# Patient Record
Sex: MALE | Race: WHITE
[De-identification: names, ages, dates, MRNs, and addresses within clinical notes are randomized per-mention and may not be internally consistent; named-entity substitution may affect disease eponyms.]

---

## 2021-01-04 ENCOUNTER — HOSPITAL ENCOUNTER (INPATIENT)
Dept: HOSPITAL 26 - MED | Age: 71
LOS: 7 days | Discharge: NURSING, REHAB OR LONG-TERM CARE | DRG: 177 | End: 2021-01-11
Attending: STUDENT IN AN ORGANIZED HEALTH CARE EDUCATION/TRAINING PROGRAM | Admitting: STUDENT IN AN ORGANIZED HEALTH CARE EDUCATION/TRAINING PROGRAM
Payer: COMMERCIAL

## 2021-01-04 VITALS — SYSTOLIC BLOOD PRESSURE: 111 MMHG | DIASTOLIC BLOOD PRESSURE: 93 MMHG

## 2021-01-04 VITALS — WEIGHT: 120 LBS | BODY MASS INDEX: 16.25 KG/M2 | HEIGHT: 72 IN

## 2021-01-04 DIAGNOSIS — I10: ICD-10-CM

## 2021-01-04 DIAGNOSIS — I69.30: ICD-10-CM

## 2021-01-04 DIAGNOSIS — E43: ICD-10-CM

## 2021-01-04 DIAGNOSIS — I25.10: ICD-10-CM

## 2021-01-04 DIAGNOSIS — E78.5: ICD-10-CM

## 2021-01-04 DIAGNOSIS — K59.00: ICD-10-CM

## 2021-01-04 DIAGNOSIS — M81.0: ICD-10-CM

## 2021-01-04 DIAGNOSIS — E87.8: ICD-10-CM

## 2021-01-04 DIAGNOSIS — K21.9: ICD-10-CM

## 2021-01-04 DIAGNOSIS — U07.1: Primary | ICD-10-CM

## 2021-01-04 DIAGNOSIS — G93.41: ICD-10-CM

## 2021-01-04 DIAGNOSIS — Z85.46: ICD-10-CM

## 2021-01-04 DIAGNOSIS — E87.3: ICD-10-CM

## 2021-01-04 DIAGNOSIS — E86.0: ICD-10-CM

## 2021-01-04 DIAGNOSIS — E11.9: ICD-10-CM

## 2021-01-04 DIAGNOSIS — E87.0: ICD-10-CM

## 2021-01-04 DIAGNOSIS — F32.9: ICD-10-CM

## 2021-01-04 PROCEDURE — U0003 INFECTIOUS AGENT DETECTION BY NUCLEIC ACID (DNA OR RNA); SEVERE ACUTE RESPIRATORY SYNDROME CORONAVIRUS 2 (SARS-COV-2) (CORONAVIRUS DISEASE [COVID-19]), AMPLIFIED PROBE TECHNIQUE, MAKING USE OF HIGH THROUGHPUT TECHNOLOGIES AS DESCRIBED BY CMS-2020-01-R: HCPCS

## 2021-01-04 PROCEDURE — C1758 CATHETER, URETERAL: HCPCS

## 2021-01-05 LAB
ALBUMIN FLD-MCNC: 2.5 G/DL (ref 3.4–5)
ALBUMIN FLD-MCNC: 2.8 G/DL (ref 3.4–5)
AMYLASE SERPL-CCNC: 37 U/L (ref 25–115)
ANION GAP SERPL CALCULATED.3IONS-SCNC: 11.5 MMOL/L (ref 8–16)
ANION GAP SERPL CALCULATED.3IONS-SCNC: 13.2 MMOL/L (ref 8–16)
APPEARANCE UR: CLEAR
AST SERPL-CCNC: 24 U/L (ref 15–37)
AST SERPL-CCNC: 27 U/L (ref 15–37)
BASOPHILS # BLD AUTO: 0 K/UL (ref 0–0.22)
BASOPHILS # BLD AUTO: 0 K/UL (ref 0–0.22)
BASOPHILS NFR BLD AUTO: 0.1 % (ref 0–2)
BASOPHILS NFR BLD AUTO: 0.2 % (ref 0–2)
BILIRUB SERPL-MCNC: 0.3 MG/DL (ref 0–1)
BILIRUB SERPL-MCNC: 0.3 MG/DL (ref 0–1)
BILIRUB UR QL STRIP: NEGATIVE
BUN SERPL-MCNC: 27 MG/DL (ref 7–18)
BUN SERPL-MCNC: 27 MG/DL (ref 7–18)
CHLORIDE SERPL-SCNC: 114 MMOL/L (ref 98–107)
CHLORIDE SERPL-SCNC: 114 MMOL/L (ref 98–107)
CHOLEST/HDLC SERPL: 3.2 {RATIO} (ref 1–4.5)
CO2 SERPL-SCNC: 28 MMOL/L (ref 21–32)
CO2 SERPL-SCNC: 31.2 MMOL/L (ref 21–32)
COLOR UR: YELLOW
CREAT SERPL-MCNC: 0.5 MG/DL (ref 0.6–1.3)
CREAT SERPL-MCNC: 0.7 MG/DL (ref 0.6–1.3)
EOSINOPHIL # BLD AUTO: 0 K/UL (ref 0–0.4)
EOSINOPHIL # BLD AUTO: 0 K/UL (ref 0–0.4)
EOSINOPHIL NFR BLD AUTO: 0.1 % (ref 0–4)
EOSINOPHIL NFR BLD AUTO: 0.1 % (ref 0–4)
ERYTHROCYTE [DISTWIDTH] IN BLOOD BY AUTOMATED COUNT: 15.3 % (ref 11.6–13.7)
ERYTHROCYTE [DISTWIDTH] IN BLOOD BY AUTOMATED COUNT: 15.4 % (ref 11.6–13.7)
GFR SERPL CREATININE-BSD FRML MDRD: (no result) ML/MIN (ref 90–?)
GFR SERPL CREATININE-BSD FRML MDRD: (no result) ML/MIN (ref 90–?)
GLUCOSE SERPL-MCNC: 117 MG/DL (ref 74–106)
GLUCOSE SERPL-MCNC: 122 MG/DL (ref 74–106)
GLUCOSE UR STRIP-MCNC: NEGATIVE MG/DL
HCT VFR BLD AUTO: 35.4 % (ref 36–52)
HCT VFR BLD AUTO: 40.6 % (ref 36–52)
HDLC SERPL-MCNC: 38 MG/DL (ref 40–60)
HGB BLD-MCNC: 11.5 G/DL (ref 12–18)
HGB BLD-MCNC: 13.2 G/DL (ref 12–18)
HGB UR QL STRIP: NEGATIVE
LDLC SERPL CALC-MCNC: 51 MG/DL (ref 60–100)
LEUKOCYTE ESTERASE UR QL STRIP: NEGATIVE
LIPASE SERPL-CCNC: 86 U/L (ref 73–393)
LYMPHOCYTES # BLD AUTO: 0.6 K/UL (ref 2–11.5)
LYMPHOCYTES # BLD AUTO: 0.8 K/UL (ref 2–11.5)
LYMPHOCYTES NFR BLD AUTO: 17.6 % (ref 20.5–51.1)
LYMPHOCYTES NFR BLD AUTO: 17.8 % (ref 20.5–51.1)
MAGNESIUM SERPL-MCNC: 1.9 MG/DL (ref 1.8–2.4)
MCH RBC QN AUTO: 29 PG (ref 27–31)
MCH RBC QN AUTO: 30 PG (ref 27–31)
MCHC RBC AUTO-ENTMCNC: 33 G/DL (ref 33–37)
MCHC RBC AUTO-ENTMCNC: 33 G/DL (ref 33–37)
MCV RBC AUTO: 90.3 FL (ref 80–94)
MCV RBC AUTO: 90.4 FL (ref 80–94)
MONOCYTES # BLD AUTO: 0.2 K/UL (ref 0.8–1)
MONOCYTES # BLD AUTO: 0.3 K/UL (ref 0.8–1)
MONOCYTES NFR BLD AUTO: 6.3 % (ref 1.7–9.3)
MONOCYTES NFR BLD AUTO: 6.5 % (ref 1.7–9.3)
NEUTROPHILS # BLD AUTO: 2.6 K/UL (ref 1.8–7.7)
NEUTROPHILS # BLD AUTO: 3.3 K/UL (ref 1.8–7.7)
NEUTROPHILS NFR BLD AUTO: 75.6 % (ref 42.2–75.2)
NEUTROPHILS NFR BLD AUTO: 75.7 % (ref 42.2–75.2)
NITRITE UR QL STRIP: NEGATIVE
PH UR STRIP: 6 [PH] (ref 5–9)
PHOSPHATE SERPL-MCNC: 3.2 MG/DL (ref 2.5–4.9)
PLATELET # BLD AUTO: 135 K/UL (ref 140–450)
PLATELET # BLD AUTO: 146 K/UL (ref 140–450)
POTASSIUM SERPL-SCNC: 3.2 MMOL/L (ref 3.5–5.1)
POTASSIUM SERPL-SCNC: 3.7 MMOL/L (ref 3.5–5.1)
PROTHROMBIN TIME: 10.8 SECS (ref 10.8–13.4)
RBC # BLD AUTO: 3.92 MIL/UL (ref 4.2–6.1)
RBC # BLD AUTO: 4.49 MIL/UL (ref 4.2–6.1)
SODIUM SERPL-SCNC: 152 MMOL/L (ref 136–145)
SODIUM SERPL-SCNC: 153 MMOL/L (ref 136–145)
TRIGL SERPL-MCNC: 165 MG/DL (ref 30–150)
TSH SERPL DL<=0.05 MIU/L-ACNC: 2.28 UIU/ML (ref 0.34–3.74)
WBC # BLD AUTO: 3.5 K/UL (ref 4.8–10.8)
WBC # BLD AUTO: 4.4 K/UL (ref 4.8–10.8)

## 2021-01-05 RX ADMIN — POTASSIUM CHLORIDE PRN MEQ: 750 TABLET, FILM COATED, EXTENDED RELEASE ORAL at 14:35

## 2021-01-05 RX ADMIN — SODIUM CHLORIDE SCH MLS/HR: 9 INJECTION, SOLUTION INTRAVENOUS at 13:47

## 2021-01-05 NOTE — NUR
PT LAYING IN BED IN NO ACUTE DISTRESS NOTED, VSS AT THIS TIME. BREATHING EVEN 
AND UNLABORED. ON OXYGEN 2 L VIA NC. REMAINS ON BEDSIDE MONITORING. CALL LIGHT 
WAS LEFT WITHIN REACH AND ALL NEEDS WERE MEET PRIOR TO EXIT. WILL CONT TO 
MONITOR.

## 2021-01-05 NOTE — NUR
-------------------------------------------------------------------------------

            *** Note undone in ED - 01/05/21 at 2014 by LUAN ***             

-------------------------------------------------------------------------------

REPORT RECEIVED FROM DONNIE HUNG FOR CONTINUITY OF CARE.

## 2021-01-05 NOTE — NUR
Pt was repositioned to the Left side at this time, adjusted HOB to desired 
height. Tolerated well. Remains on continues bedside monitoring will cont to 
monitor.

## 2021-01-05 NOTE — NUR
NOTIFIED DR. RIVAS OF PT'S PLT  AND THAT PT HAD SCHEDULED HEPARIN 5000 
UNITS. STATES THAT "OKAY TO GIVE HEPARIN PER PHARMACY PROTOCOL".

## 2021-01-05 NOTE — NUR
Spoke with Lydialeti Kay (Wife) contact info: [phone], & provided update 
on pt status at this time. Answered all questions asked and provided with call 
back extension.

## 2021-01-05 NOTE — NUR
PT LAYING IN BED IN NO ACUTE DISTRESS NOTED, VSS AT THIS TIME. BREATHING EVEN 
AND UNLABORED. ON OXYGEN 2 L VIA NC. REFUSED DINNER AT THIS TIME. REMAINS ON 
CARDIAC MONITORING, BP MONITORING AND PULSE OXIMETRY. CALL LIGHT WAS LEFT 
WITHIN REACH AND ALL NEEDS WERE MEET PRIOR TO EXIT. WILL CONT TO MONITOR.

## 2021-01-05 NOTE — NUR
SOCIAL WORK NOTE:



Patient's Orientation 

Unable To Assess

Information Provided By 

BHAVNA DIAZ - WIFE

Comments 

SW WAS UNABLE TO MEET PATIENT AT BEDSIDE. SW CONTACTED [facility] WHO 

PROVIDED WIFE'S CONTACT INFORMATION.

, Realtionship and Phone Number 

BHAVNA MORRIS

[phone]

Healthcare Power of  

No

Does Patient Have a POLST 

No

Identifying Problems 

No Social Work Triggers

Is A Social Work Consult Needed 

No

Mandate Report Filed 

No

Explanation Of Identifying Problems 

PATIENT IS A 70-YEAR-OLD MALE ADMITTED FOR HYPERNATREMIA. PATIENT HAS PMHX 

OF PROSTATE CANCER, CEBREBROVASCULAR ACCIDE, AND HYPERTENSION.

Admitted From 

[facility]

Skilled Nursing Facility 

[facility] - [phone]

Pre-Admission Level Of Functioning Status 

Total Care

Prior Resources/Services Used In Last 12 Months 

SNF [facility]

Prior Resources/Service Comments 

PATIENT IS retirement AND ON A BED HOLD.

Prior [facility] 

Hospital Bed

Wheelchair

Dialysis Comments 

N/A

Patient Had Caregiver 

No

Home Support 

No Caregiver Issues

Financial Issues 

No Known Financial Issue

Referral To The Financial Counselor Needed 

No

Factors/Needs 

No D/C Needs Identified

Pt/Rep Participated In Discharge Plan 

Yes

Patient/Family Agress With Discharge Plan 

Yes

Discharge Plan Comments 

TENTATIVE DISCHARGE PLAN IS FOR PATIENT TO RETURN HOME.

DC Plan Status 

Initiated

## 2021-01-05 NOTE — NUR
PATIENT HAS BEEN SCREENED AND CATEGORIZED AS HIGH NUTRITION RISK. PATIENT WILL BE SEEN 
WITHIN 1-2 DAYS OF ADMISSION.



1/6/21 - 1/7/21



MARIANELA KERR MBA, RD

## 2021-01-06 VITALS — SYSTOLIC BLOOD PRESSURE: 109 MMHG | DIASTOLIC BLOOD PRESSURE: 61 MMHG

## 2021-01-06 VITALS — SYSTOLIC BLOOD PRESSURE: 99 MMHG | DIASTOLIC BLOOD PRESSURE: 54 MMHG

## 2021-01-06 VITALS — SYSTOLIC BLOOD PRESSURE: 105 MMHG | DIASTOLIC BLOOD PRESSURE: 59 MMHG

## 2021-01-06 LAB
ANION GAP SERPL CALCULATED.3IONS-SCNC: 15.5 MMOL/L (ref 8–16)
BASOPHILS # BLD AUTO: 0 K/UL (ref 0–0.22)
BASOPHILS NFR BLD AUTO: 0.2 % (ref 0–2)
BUN SERPL-MCNC: 20 MG/DL (ref 7–18)
CHLORIDE SERPL-SCNC: 116 MMOL/L (ref 98–107)
CO2 SERPL-SCNC: 23.8 MMOL/L (ref 21–32)
CREAT SERPL-MCNC: 0.5 MG/DL (ref 0.6–1.3)
EOSINOPHIL # BLD AUTO: 0 K/UL (ref 0–0.4)
EOSINOPHIL NFR BLD AUTO: 0.1 % (ref 0–4)
ERYTHROCYTE [DISTWIDTH] IN BLOOD BY AUTOMATED COUNT: 14.6 % (ref 11.6–13.7)
GFR SERPL CREATININE-BSD FRML MDRD: (no result) ML/MIN (ref 90–?)
GLUCOSE SERPL-MCNC: 85 MG/DL (ref 74–106)
HCT VFR BLD AUTO: 35.5 % (ref 36–52)
HGB BLD-MCNC: 11.6 G/DL (ref 12–18)
LYMPHOCYTES # BLD AUTO: 0.9 K/UL (ref 2–11.5)
LYMPHOCYTES NFR BLD AUTO: 26.1 % (ref 20.5–51.1)
MAGNESIUM SERPL-MCNC: 1.9 MG/DL (ref 1.8–2.4)
MCH RBC QN AUTO: 29 PG (ref 27–31)
MCHC RBC AUTO-ENTMCNC: 33 G/DL (ref 33–37)
MCV RBC AUTO: 89.8 FL (ref 80–94)
MONOCYTES # BLD AUTO: 0.2 K/UL (ref 0.8–1)
MONOCYTES NFR BLD AUTO: 6.5 % (ref 1.7–9.3)
NEUTROPHILS # BLD AUTO: 2.3 K/UL (ref 1.8–7.7)
NEUTROPHILS NFR BLD AUTO: 67.1 % (ref 42.2–75.2)
PHOSPHATE SERPL-MCNC: 2.6 MG/DL (ref 2.5–4.9)
PLATELET # BLD AUTO: 117 K/UL (ref 140–450)
POTASSIUM SERPL-SCNC: 3.3 MMOL/L (ref 3.5–5.1)
RBC # BLD AUTO: 3.95 MIL/UL (ref 4.2–6.1)
SODIUM SERPL-SCNC: 152 MMOL/L (ref 136–145)
T4 SERPL-MCNC: 6.4 UG/DL (ref 4.5–12)
WBC # BLD AUTO: 3.4 K/UL (ref 4.8–10.8)

## 2021-01-06 RX ADMIN — DIVALPROEX SODIUM SCH MG: 125 CAPSULE ORAL at 11:02

## 2021-01-06 RX ADMIN — Medication SCH TAB: at 11:03

## 2021-01-06 RX ADMIN — SODIUM CHLORIDE SCH MLS/HR: 9 INJECTION, SOLUTION INTRAVENOUS at 01:57

## 2021-01-06 RX ADMIN — FAMOTIDINE SCH MG: 20 TABLET ORAL at 09:43

## 2021-01-06 RX ADMIN — CALCIUM SCH MG: 500 TABLET ORAL at 11:02

## 2021-01-06 RX ADMIN — DIVALPROEX SODIUM SCH MG: 125 CAPSULE ORAL at 21:36

## 2021-01-06 RX ADMIN — CLOPIDOGREL BISULFATE SCH MG: 75 TABLET, FILM COATED ORAL at 09:42

## 2021-01-06 RX ADMIN — Medication SCH DEV: at 09:50

## 2021-01-06 RX ADMIN — DOCUSATE SODIUM SCH MG: 100 CAPSULE, LIQUID FILLED ORAL at 09:43

## 2021-01-06 RX ADMIN — DEXTROSE AND SODIUM CHLORIDE SCH MLS/HR: 5; .45 INJECTION, SOLUTION INTRAVENOUS at 09:47

## 2021-01-06 RX ADMIN — Medication SCH DEV: at 22:14

## 2021-01-06 RX ADMIN — POTASSIUM CHLORIDE PRN MEQ: 750 TABLET, FILM COATED, EXTENDED RELEASE ORAL at 14:33

## 2021-01-06 RX ADMIN — Medication SCH DEV: at 11:14

## 2021-01-06 RX ADMIN — Medication SCH MG: at 09:43

## 2021-01-06 RX ADMIN — ATORVASTATIN CALCIUM SCH MG: 20 TABLET, FILM COATED ORAL at 21:35

## 2021-01-06 RX ADMIN — SERTRALINE HYDROCHLORIDE SCH MG: 50 TABLET ORAL at 21:37

## 2021-01-06 RX ADMIN — Medication SCH DEV: at 17:02

## 2021-01-06 RX ADMIN — FAMOTIDINE SCH MG: 20 TABLET ORAL at 21:34

## 2021-01-06 RX ADMIN — MEGESTROL ACETATE SCH MG: 40 TABLET ORAL at 11:03

## 2021-01-06 NOTE — NUR
PT LAYING IN BED IN NO ACUTE DISTRESS NOTED, VSS AT THIS TIME. BREATHING EVEN 
AND UNLABORED. O2 sat at 97% on RA removed from NC at this time. REPOSITIONED 
TO THE LEFT SIDE. REMAINS ON BEDSIDE MONITORING. CALL LIGHT WAS LEFT WITHIN 
REACH AND ALL NEEDS WERE MEET PRIOR TO EXIT. WILL CONT TO MONITOR.

## 2021-01-06 NOTE — NUR
Pt accidently pulled on R hand IV site was noted bleeding from site. Pressure 
was applied and clean dressing placed.

## 2021-01-06 NOTE — NUR
PT LAYING IN BED IN NO ACUTE DISTRESS NOTED, VSS AT THIS TIME. BREATHING EVEN 
AND UNLABORED. O2 sat at 97% on RA removed from NC at this time. REMAINS ON 
BEDSIDE MONITORING. CALL LIGHT WAS LEFT WITHIN REACH AND ALL NEEDS WERE MEET 
PRIOR TO EXIT. WILL CONT TO MONITOR.

## 2021-01-06 NOTE — NUR
GLUCOSE-69, NO INSULIN COVERAGE. ORANGE JUICE 100ML AND VANILLA PUDDING SERVED, ABLE TO 
CONSUMED 100%. FEEDING ASSISTED WELL AND DUE MEDICATION WITH APPLE SAUCE.

## 2021-01-06 NOTE — NUR
DR. TATUM MADE ROUNDS, ASKED ABOUT THE IV FLUID, APPARENTLY NA-153, ACCORDING TO HER TO 
CHANGE TO D5 0.45% NS AT 60ML/HOUR,CARRIED OUT

## 2021-01-06 NOTE — NUR
RECEIVED ENDORSEMENT FROM NIGHT SHIFT, AWAKE,ALERT, ORIENTEDX1, BREATHING SPONTANEOUSLY AT 
ROOM AIR, NOT IN DISTRESS NOTED, WITH ONGOING IV FLUID WITH 0.45% NS AT 60ML/HOUR INFUSING 
AT RT FOREARM G24 IV CANNULA NOTED. ADMITTED AS A CASE OF HYPERNATREMIA, GENERALIZED 
WEAKNESS UNDER , ATTENDING PHYSICIAN. AWAITING TELEMETRY BED. SAFETY MEASURES IN PLACE 
AND CONTINUE MONITOR.

## 2021-01-06 NOTE — NUR
GLUCOSE-104, NO INSULIN COVERAGE. METFORMIN NON ADMINISTER, LOW GLUCOSE LEVEL AND POOR ORAL 
INTAKE. DR. TATUM INFORMED IN THE UNIT TO REFER IN SWALLOW AND PT EVALUATION.

## 2021-01-06 NOTE — NUR
PT WAS CHANGED AND REPOSITION, SKIN WAS LEFT CLEAN AND DRY. SKIN WAS INTACT. 
MINOR REDNESS NOTED AT SACRAL AREA. NO OPEN WOUNDS, SKIN TEARS OR LACERATIONS 
NOTED. CHANGED INTO NEW GOWN AND PROVIDED NEW WARM BLANKETS.

## 2021-01-06 NOTE — NUR
DR. TATUM INFORMED IN THE UNIT THAT PATIENT HAD POR ORAL INTAKE, HX OF CVA, IF CAN REFER 
TO PT/ SWALLOW EVALUATION.

## 2021-01-06 NOTE — NUR
PT RESTING IN BED WITH EYES OPEN, NONVERBAL AT THIS TIME.  REMAINS IN BEDSIDE 
MONITOR.  RESPIRATIONS REGULAR EVEN AND UNLABORED.

## 2021-01-06 NOTE — NUR
Pt was repositioned to the right side at this time. Tolerated well. Remains on 
continues bedside monitoring will cont to monitor.

## 2021-01-06 NOTE — NUR
PT IS ASLEEP. VISIBLE RISE AND FALL OF CHEST NOTED. BED IS LOCKED AND IN LOWEST 
POSITION. SIDE RAILS X 2 FOR SAFETY OF PT. PT IS CONNECTED TO THE CARDIAC 
MONITOR. PT IS NOT IN ANY VISIBLE DISTRESS AT THIS TIME. CALL LIGHT WITHIN 
REACH. WILL CONTINUE TO MONITOR. REPOSITIONED TO THE LEFT SIDE.

## 2021-01-07 VITALS — DIASTOLIC BLOOD PRESSURE: 86 MMHG | SYSTOLIC BLOOD PRESSURE: 136 MMHG

## 2021-01-07 VITALS — DIASTOLIC BLOOD PRESSURE: 70 MMHG | SYSTOLIC BLOOD PRESSURE: 137 MMHG

## 2021-01-07 VITALS — SYSTOLIC BLOOD PRESSURE: 110 MMHG | DIASTOLIC BLOOD PRESSURE: 67 MMHG

## 2021-01-07 VITALS — SYSTOLIC BLOOD PRESSURE: 109 MMHG | DIASTOLIC BLOOD PRESSURE: 66 MMHG

## 2021-01-07 LAB
ANION GAP SERPL CALCULATED.3IONS-SCNC: 13.1 MMOL/L (ref 8–16)
BASOPHILS # BLD AUTO: 0 K/UL (ref 0–0.22)
BASOPHILS NFR BLD AUTO: 0.4 % (ref 0–2)
BUN SERPL-MCNC: 13 MG/DL (ref 7–18)
CHLORIDE SERPL-SCNC: 113 MMOL/L (ref 98–107)
CO2 SERPL-SCNC: 26.8 MMOL/L (ref 21–32)
CREAT SERPL-MCNC: 0.5 MG/DL (ref 0.6–1.3)
EOSINOPHIL # BLD AUTO: 0 K/UL (ref 0–0.4)
EOSINOPHIL NFR BLD AUTO: 0.1 % (ref 0–4)
ERYTHROCYTE [DISTWIDTH] IN BLOOD BY AUTOMATED COUNT: 14.7 % (ref 11.6–13.7)
GFR SERPL CREATININE-BSD FRML MDRD: (no result) ML/MIN (ref 90–?)
GLUCOSE SERPL-MCNC: 154 MG/DL (ref 74–106)
HCT VFR BLD AUTO: 34.2 % (ref 36–52)
HGB BLD-MCNC: 11.3 G/DL (ref 12–18)
LYMPHOCYTES # BLD AUTO: 0.7 K/UL (ref 2–11.5)
LYMPHOCYTES NFR BLD AUTO: 20 % (ref 20.5–51.1)
MAGNESIUM SERPL-MCNC: 1.3 MG/DL (ref 1.8–2.4)
MCH RBC QN AUTO: 29 PG (ref 27–31)
MCHC RBC AUTO-ENTMCNC: 33 G/DL (ref 33–37)
MCV RBC AUTO: 89 FL (ref 80–94)
MONOCYTES # BLD AUTO: 0.2 K/UL (ref 0.8–1)
MONOCYTES NFR BLD AUTO: 5.5 % (ref 1.7–9.3)
NEUTROPHILS # BLD AUTO: 2.6 K/UL (ref 1.8–7.7)
NEUTROPHILS NFR BLD AUTO: 74 % (ref 42.2–75.2)
PHOSPHATE SERPL-MCNC: 2.5 MG/DL (ref 2.5–4.9)
PLATELET # BLD AUTO: 111 K/UL (ref 140–450)
POTASSIUM SERPL-SCNC: 2.9 MMOL/L (ref 3.5–5.1)
RBC # BLD AUTO: 3.85 MIL/UL (ref 4.2–6.1)
SODIUM SERPL-SCNC: 150 MMOL/L (ref 136–145)
WBC # BLD AUTO: 3.5 K/UL (ref 4.8–10.8)

## 2021-01-07 RX ADMIN — POTASSIUM CHLORIDE PRN MEQ: 750 TABLET, FILM COATED, EXTENDED RELEASE ORAL at 12:53

## 2021-01-07 RX ADMIN — INSULIN LISPRO PRN UNITS: 100 INJECTION, SOLUTION INTRAVENOUS; SUBCUTANEOUS at 16:43

## 2021-01-07 RX ADMIN — OXYCODONE HYDROCHLORIDE AND ACETAMINOPHEN SCH MG: 500 TABLET ORAL at 09:46

## 2021-01-07 RX ADMIN — FAMOTIDINE SCH MG: 20 TABLET ORAL at 09:47

## 2021-01-07 RX ADMIN — MEGESTROL ACETATE SCH MG: 40 TABLET ORAL at 09:48

## 2021-01-07 RX ADMIN — SERTRALINE HYDROCHLORIDE SCH MG: 50 TABLET ORAL at 21:34

## 2021-01-07 RX ADMIN — Medication SCH MG: at 09:47

## 2021-01-07 RX ADMIN — DEXTROSE AND SODIUM CHLORIDE SCH MLS/HR: 5; .45 INJECTION, SOLUTION INTRAVENOUS at 18:45

## 2021-01-07 RX ADMIN — ATORVASTATIN CALCIUM SCH MG: 20 TABLET, FILM COATED ORAL at 21:33

## 2021-01-07 RX ADMIN — Medication SCH DEV: at 21:27

## 2021-01-07 RX ADMIN — Medication SCH DEV: at 07:24

## 2021-01-07 RX ADMIN — DEXTROSE AND SODIUM CHLORIDE SCH MLS/HR: 5; .45 INJECTION, SOLUTION INTRAVENOUS at 02:05

## 2021-01-07 RX ADMIN — Medication SCH DEV: at 16:43

## 2021-01-07 RX ADMIN — DOCUSATE SODIUM SCH MG: 100 CAPSULE, LIQUID FILLED ORAL at 09:50

## 2021-01-07 RX ADMIN — Medication SCH TAB: at 09:47

## 2021-01-07 RX ADMIN — Medication SCH IU: at 09:00

## 2021-01-07 RX ADMIN — DIVALPROEX SODIUM SCH MG: 125 CAPSULE ORAL at 09:50

## 2021-01-07 RX ADMIN — DIVALPROEX SODIUM SCH MG: 125 CAPSULE ORAL at 21:33

## 2021-01-07 RX ADMIN — CALCIUM SCH MG: 500 TABLET ORAL at 09:49

## 2021-01-07 RX ADMIN — CLOPIDOGREL BISULFATE SCH MG: 75 TABLET, FILM COATED ORAL at 09:51

## 2021-01-07 RX ADMIN — ENOXAPARIN SODIUM SCH MG: 40 INJECTION SUBCUTANEOUS at 09:51

## 2021-01-07 RX ADMIN — FAMOTIDINE SCH MG: 20 TABLET ORAL at 21:34

## 2021-01-07 RX ADMIN — Medication SCH DEV: at 11:48

## 2021-01-07 NOTE — NUR
MEDICATION DUE GIVEN AND CHECK VITAL SIGN PRIOR TO MEDICATION /70 RI 91 PATIENT IS 
ABLE TO SWALLOW BUT HAVE TO CRUSH MEDICATIONS, PATIENT IS STABLE NO DISTRESS NOTED.

## 2021-01-07 NOTE — NUR
Patient will be admitted to care of DR SOLANO. Admited to TELE.  Will go to room 
119A . Belongings list completed.  Report to KRISTIN HUNG.

## 2021-01-07 NOTE — NUR
RECEIVED REPORT FROM  NIGHT NURSE PATIENT IS AOX1 ORIENTED TO PERSON ON ROOM AIR SATURATION 
AT 98%, MECHANICAL SOFT, INCONTINENT, SKIN INTACT, INTACT IV ON RIGHT AC, HEAD CT DONE AND 
REVEALS OLD CVA, POTASSIUM IV GIVEN FOR SWALLOW EVALUATION, SAFETY MEASURES IN PLACE AND 
CALL LIGHT WITHIN REACH. WILL CONTINUE TO MONITOR.

## 2021-01-07 NOTE — NUR
*ST: Bedside Swallow Evaluation*



  Pt is 71 yo M BIBA from SNF 1/4/2021 c AMS x3 days, hypernatremia. +COVID-19 PCR 
01/05/2021. PMHx CVA, depression c mood disturbance, CAD, HTN, DM, HLD, GERD, constipation, 
prostate CA.



  CTH 1/5 - no evidence of acute infarct, bleed, or fracture; old infarcts in the R basal 
ganglia. CXR 1/5 (-) acute.



  Cleared with RNRubia, for BDSE. Per RN, pt took a few bites of MS/Thin Liquids 
breakfast tray but very slow in swallowing boli. Per chart, Pt received Ground/Honey Thick 
Liquids at SNF prior to admit.



  Pt seen bedside, on +COVID-19 isolation precautions, on room air, slow to awaken with 
verbal calling to Pts name, firm sternal rub, and oral care. Mild R facial asymmetry at 
baseline. Pt sustained eye opening after oral care. Pt did not follow commands. No phonation 
emitted/elicited.



  ~1oz canned MS pears, ~2oz apple sauce, ~2oz honey thick liquids, and ~2 oz nectar thick 
liquids given. Pt generally stripped 100% of bolus from tsp, engaged in munching mastication 
pattern with pears, suspect piecemeal deglutition of pears with SLP having to remove 
masticated boli from oral cavity after Pt engaged in multiple swallows but no clearing and 
coughing during swallow with pears. With puree and thickened liquids, no residue observed. 
Pt had generally consistent cough with MS pears and nectar thick liquid trials. No overt 
coughing nor throat clearing with puree nor with honey thick liquids. Plan of care d/w pt 
and pts RN.



P: Rec Puree/Honey Thick Liquids by tsp, feed slowly when Pt sustains attention to task

  Follow safe swallow strategies, oral care, aspiration precautions

  Nsg to monitor and notify SLP of acute changes in status



-Tamara Malcolm MA, CCC-SLP

-------------------------------------------------------------------------------

Addendum: 01/07/21 at 1600 by [facility]

-------------------------------------------------------------------------------

Amended: Links added.

## 2021-01-07 NOTE — NUR
PASSED ON TO ACCEPTING RN ON FLOOR, THAT PT'S WIFE, BHAVNA, WOULD LIKE TO HAVE 
MD CALL HER,  MAKE SURE PT HAS ASSITANCE WHEN EATING MEALS (MECHANICAL SOFT), 
AND HE IS EATING ALL HIS MEALS.  SHES CONCERNED HE HASN'T EATEN LAST FEW DAYS.

## 2021-01-07 NOTE — NUR
SPOKE TO PT'S WIFE, BHAVNA.  SHE IS VERY CONCERNED ABOUT HER  AND HIM 
NOT GETTING THE PROPER NUTRITIONAL NEEDS.  PT NEEDS A PUREED OR MECHANICAL SOFT 
DIET, DID NOT RECEIVE THAT FOR DINNER.  I PROVIDED PT WITH TWO CUPS OF 
APPLESAUCE AND PT IS RECEIVING D5 1/2 NS.  WIFE WAS ASKING IF ADMISSION IS 
ABSOLUTELY NECESSARY OR COULD HE POSSIBLY GO BACK TO HIS FACILITY.  TEXTED MD KING.  BHAVNA WOULD ASLO LIKE MD TO CONTACT HER.  PROVIDED MD WITH HER 
NUMBER.





BHAVNA - [phone]

## 2021-01-07 NOTE — NUR
called lab regarding "pending" pcr and lanre results.  told lanre was 
inconclusive and still waiting for pcr results.

## 2021-01-07 NOTE — NUR
1/7/21 RD INITIAL ASSESSMENT COMPLETED

PLEASE REFER TO NUTRITION ASSESSMENT UNDER CARE ACTIVITY FOR ESTIMATED NUTRITIONAL NEEDS. 



RD RECOMMENDATIONS:

1. RECOMMEND CONTINUE MECHANICAL SOFT DIET

2. WILL ADD DIET HEALTHSHAKES TID WITH MEALS TO HELP MEET GOALS/INCREASE ENERGY & PROTEIN 
INTAKE

3. ENCOURAGE INCREASED PO INTAKE, ASSIST WITH DIET AS NEEDED

4.MODIFY DIET IF NEEDED AS PER SWALLOW EVALUATION RECOMMENDATIONS.

5. F/U 2-3 DAYS; HIGH RISK

MARIANELA EKRR MBA, RD

## 2021-01-07 NOTE — NUR
RECEIVED REPORT FROM DAY ABHILASH PATRICK. PT APHASIC, OPENS EYES SPONTANEOUSLY, ON ROOM AIR. NO 
S/S RESPIRATORY DISTRESS. FLACC 0. IV SITE RAC 24G PATENT INTACT INFUSING IVF AS ORDERED. 
SAFETY MEASURES IN PLACE. CALL LIGHT WITHIN REACH. WILL CONTINUE TO MONITOR

## 2021-01-07 NOTE — NUR
RECEIVED REPORT FROM ER NURSE ELIZABETH. PT ON TELE, AOX1 ON ROOM AIR, OPENS EYES SPONTANEOUSLY, 
NO S/S RESPIRATORY DISTRESS. FLACC 0. IV SITE RAC 24G PATENT INTACT INFUSING IVF AS ORDERED. 
ORIENTED PT TO ROOM AND HOSPITAL. SAFETY MEASURES AND DROPLET PRECAUTION IN PLACE. CALL 
LIGHT WITHIN REACH. WILL CONTINUE TO MONITOR. VS: O2 SAT 98% HR 82 RR 16 /70 TEMP 
96.6. DX: HYPERNATREMIA, GEN. WEAKNESS. HX: CVA L SIDE, DM, HTN, CAD, HLD, DEMENTIA, 
PROSTATE CA.

## 2021-01-08 VITALS — DIASTOLIC BLOOD PRESSURE: 67 MMHG | SYSTOLIC BLOOD PRESSURE: 120 MMHG

## 2021-01-08 VITALS — SYSTOLIC BLOOD PRESSURE: 114 MMHG | DIASTOLIC BLOOD PRESSURE: 59 MMHG

## 2021-01-08 VITALS — SYSTOLIC BLOOD PRESSURE: 132 MMHG | DIASTOLIC BLOOD PRESSURE: 70 MMHG

## 2021-01-08 VITALS — DIASTOLIC BLOOD PRESSURE: 87 MMHG | SYSTOLIC BLOOD PRESSURE: 134 MMHG

## 2021-01-08 VITALS — DIASTOLIC BLOOD PRESSURE: 59 MMHG | SYSTOLIC BLOOD PRESSURE: 109 MMHG

## 2021-01-08 VITALS — DIASTOLIC BLOOD PRESSURE: 51 MMHG | SYSTOLIC BLOOD PRESSURE: 99 MMHG

## 2021-01-08 LAB
ALBUMIN FLD-MCNC: 2.2 G/DL (ref 3.4–5)
ANION GAP SERPL CALCULATED.3IONS-SCNC: 12.9 MMOL/L (ref 8–16)
AST SERPL-CCNC: 24 U/L (ref 15–37)
BASOPHILS # BLD AUTO: 0 K/UL (ref 0–0.22)
BASOPHILS NFR BLD AUTO: 0.1 % (ref 0–2)
BILIRUB SERPL-MCNC: 0.2 MG/DL (ref 0–1)
BUN SERPL-MCNC: 12 MG/DL (ref 7–18)
CHLORIDE SERPL-SCNC: 111 MMOL/L (ref 98–107)
CO2 SERPL-SCNC: 25.2 MMOL/L (ref 21–32)
CREAT SERPL-MCNC: 0.5 MG/DL (ref 0.6–1.3)
EOSINOPHIL # BLD AUTO: 0 K/UL (ref 0–0.4)
EOSINOPHIL NFR BLD AUTO: 0.1 % (ref 0–4)
ERYTHROCYTE [DISTWIDTH] IN BLOOD BY AUTOMATED COUNT: 14.3 % (ref 11.6–13.7)
GFR SERPL CREATININE-BSD FRML MDRD: (no result) ML/MIN (ref 90–?)
GLUCOSE SERPL-MCNC: 121 MG/DL (ref 74–106)
HCT VFR BLD AUTO: 32.7 % (ref 36–52)
HGB BLD-MCNC: 11 G/DL (ref 12–18)
LDH SERPL-CCNC: 234 U/L (ref 85–227)
LYMPHOCYTES # BLD AUTO: 1 K/UL (ref 2–11.5)
LYMPHOCYTES NFR BLD AUTO: 27.3 % (ref 20.5–51.1)
MAGNESIUM SERPL-MCNC: 1.6 MG/DL (ref 1.8–2.4)
MCH RBC QN AUTO: 30 PG (ref 27–31)
MCHC RBC AUTO-ENTMCNC: 34 G/DL (ref 33–37)
MCV RBC AUTO: 87.6 FL (ref 80–94)
MONOCYTES # BLD AUTO: 0.3 K/UL (ref 0.8–1)
MONOCYTES NFR BLD AUTO: 6.9 % (ref 1.7–9.3)
NEUTROPHILS # BLD AUTO: 2.5 K/UL (ref 1.8–7.7)
NEUTROPHILS NFR BLD AUTO: 65.6 % (ref 42.2–75.2)
PHOSPHATE SERPL-MCNC: 2.3 MG/DL (ref 2.5–4.9)
PLATELET # BLD AUTO: 121 K/UL (ref 140–450)
POTASSIUM SERPL-SCNC: 3.1 MMOL/L (ref 3.5–5.1)
RBC # BLD AUTO: 3.73 MIL/UL (ref 4.2–6.1)
SODIUM SERPL-SCNC: 146 MMOL/L (ref 136–145)
WBC # BLD AUTO: 3.8 K/UL (ref 4.8–10.8)

## 2021-01-08 RX ADMIN — Medication SCH IU: at 08:58

## 2021-01-08 RX ADMIN — MEGESTROL ACETATE SCH MG: 40 TABLET ORAL at 08:59

## 2021-01-08 RX ADMIN — ENOXAPARIN SODIUM SCH MG: 40 INJECTION SUBCUTANEOUS at 09:00

## 2021-01-08 RX ADMIN — CLOPIDOGREL BISULFATE SCH MG: 75 TABLET, FILM COATED ORAL at 09:00

## 2021-01-08 RX ADMIN — ATORVASTATIN CALCIUM SCH MG: 20 TABLET, FILM COATED ORAL at 21:00

## 2021-01-08 RX ADMIN — Medication SCH TAB: at 08:59

## 2021-01-08 RX ADMIN — Medication SCH MG: at 08:58

## 2021-01-08 RX ADMIN — AZITHROMYCIN DIHYDRATE SCH MG: 250 TABLET, FILM COATED ORAL at 09:00

## 2021-01-08 RX ADMIN — Medication SCH DEV: at 05:58

## 2021-01-08 RX ADMIN — FAMOTIDINE SCH MG: 20 TABLET ORAL at 21:00

## 2021-01-08 RX ADMIN — DOCUSATE SODIUM SCH MG: 100 CAPSULE, LIQUID FILLED ORAL at 08:59

## 2021-01-08 RX ADMIN — DEXTROSE AND SODIUM CHLORIDE SCH MLS/HR: 5; .45 INJECTION, SOLUTION INTRAVENOUS at 11:25

## 2021-01-08 RX ADMIN — Medication SCH DEV: at 11:42

## 2021-01-08 RX ADMIN — MAGNESIUM SULFATE IN WATER PRN MLS/HR: 40 INJECTION, SOLUTION INTRAVENOUS at 02:32

## 2021-01-08 RX ADMIN — Medication SCH DEV: at 21:00

## 2021-01-08 RX ADMIN — Medication SCH DEV: at 16:40

## 2021-01-08 RX ADMIN — DIVALPROEX SODIUM SCH MG: 125 CAPSULE ORAL at 08:59

## 2021-01-08 RX ADMIN — FAMOTIDINE SCH MG: 20 TABLET ORAL at 09:00

## 2021-01-08 RX ADMIN — DIVALPROEX SODIUM SCH MG: 125 CAPSULE ORAL at 21:00

## 2021-01-08 RX ADMIN — CALCIUM SCH MG: 500 TABLET ORAL at 08:59

## 2021-01-08 RX ADMIN — SERTRALINE HYDROCHLORIDE SCH MG: 50 TABLET ORAL at 21:00

## 2021-01-08 RX ADMIN — POTASSIUM CHLORIDE PRN MEQ: 750 TABLET, FILM COATED, EXTENDED RELEASE ORAL at 13:45

## 2021-01-08 RX ADMIN — OXYCODONE HYDROCHLORIDE AND ACETAMINOPHEN SCH MG: 500 TABLET ORAL at 09:00

## 2021-01-08 RX ADMIN — MAGNESIUM SULFATE IN WATER PRN MLS/HR: 40 INJECTION, SOLUTION INTRAVENOUS at 11:29

## 2021-01-08 NOTE — NUR
PATIENT RESTING COMFORTABLY IN BED. NO S/S ACUTE DISTRESS. ISOLATION PRECAUTIONS OBSERVED. 
SAFETY PRECAUTIONS IN PLACE. FREQUENT ROUNDS BY ALL STAFF.

## 2021-01-08 NOTE — NUR
DUE MEDS GIVEN. NO S/S ACUTE DISTRESS. ISOLATION PRECAUTIONS OBSERVED. SAFETY PRECAUTIONS IN 
PLACE. FREQUENT ROUNDS BY ALL STAFF.

## 2021-01-08 NOTE — NUR
DC PLANNER:

POSSIBLE DC BACK TO [facility] TODAY FAXED CLINICALS. RECEIVED A CALL FROM SANCHO AT [facility] PATIENT CAN RETURN TO HIS ROOM 19-A UNDER DR. SALEH. 

-------------------------------------------------------------------------------

Addendum: 01/08/21 at 1350 by Jeannie Rodriguez CM

-------------------------------------------------------------------------------

DC PLANNER:

RECEIVED AUTH FROM [facility] FOR TRANSPORTATION. AUTH# [id number]. WHEN PATIENT IS READY FOR DC 
TRANSPORTATION CAN BE SET UP WITH [facility] [phone].

-------------------------------------------------------------------------------

Addendum: 01/08/21 at 1614 by Jeannie Rodriguez CM

-------------------------------------------------------------------------------

DC JEREMY:

SET UP WILL CALL TRANSPORTATION WITH [facility] [phone]

-------------------------------------------------------------------------------

Addendum: 01/11/21 at 0913 by Jeannie Rodriguez CM

-------------------------------------------------------------------------------

BUSTER PLANNER:

SET UP TRANSPORTATION WITH [facility] [phone]. PATIENT WILL BE GOING TO [facility] 
ROOM 3-B. NOTIFIED ABHILASH RAJPUT AT [facility].

## 2021-01-08 NOTE — NUR
PT STATES NO PAIN AND SLEEPING COMFORTABLY IN BED. PT REFUSES TO ENGAGE IN ANY INTERACTION 
BUT COMPLIANT W/ CARE/

## 2021-01-08 NOTE — NUR
RECEIVED PATIENT FROM AM SHIFT NURSE FOR CONTINUITY OF CARE. ABLE TO FOLLOW DIRECTION AND 
MAKE SIMPLE NEEDS KNOWN. RESPIRATIONS EVEN, UNLABORED. NO C/O PAIN. NO S/S ACUTE DISTRESS. 
SKIN WARM, DRY. IV SITE TO RIGHT AC 24G PATENT/INTACT, INFUSING FLUIDS WELL. ABDOMEN SOFT, 
NONTENDER, NONDISTENDED. BOWEL SOUNDS ACTIVE X4 QUADRANTS. INCONTINENT OF B/B. PLAN OF CARE 
DISCUSSED. ISOLATION PRECAUTIONS OBSERVED. SAFETY PRECAUTIONS IN PLACE.

## 2021-01-09 VITALS — DIASTOLIC BLOOD PRESSURE: 63 MMHG | SYSTOLIC BLOOD PRESSURE: 113 MMHG

## 2021-01-09 VITALS — DIASTOLIC BLOOD PRESSURE: 60 MMHG | SYSTOLIC BLOOD PRESSURE: 130 MMHG

## 2021-01-09 VITALS — DIASTOLIC BLOOD PRESSURE: 70 MMHG | SYSTOLIC BLOOD PRESSURE: 108 MMHG

## 2021-01-09 VITALS — SYSTOLIC BLOOD PRESSURE: 143 MMHG | DIASTOLIC BLOOD PRESSURE: 78 MMHG

## 2021-01-09 VITALS — SYSTOLIC BLOOD PRESSURE: 118 MMHG | DIASTOLIC BLOOD PRESSURE: 55 MMHG

## 2021-01-09 VITALS — SYSTOLIC BLOOD PRESSURE: 138 MMHG | DIASTOLIC BLOOD PRESSURE: 93 MMHG

## 2021-01-09 LAB
ALBUMIN FLD-MCNC: 2.3 G/DL (ref 3.4–5)
ANION GAP SERPL CALCULATED.3IONS-SCNC: 12.3 MMOL/L (ref 8–16)
ANION GAP SERPL CALCULATED.3IONS-SCNC: 13.1 MMOL/L (ref 8–16)
AST SERPL-CCNC: 54 U/L (ref 15–37)
BASOPHILS # BLD AUTO: 0 K/UL (ref 0–0.22)
BASOPHILS NFR BLD AUTO: 0.1 % (ref 0–2)
BILIRUB SERPL-MCNC: 0.3 MG/DL (ref 0–1)
BUN SERPL-MCNC: 14 MG/DL (ref 7–18)
BUN SERPL-MCNC: 16 MG/DL (ref 7–18)
CHLORIDE SERPL-SCNC: 107 MMOL/L (ref 98–107)
CHLORIDE SERPL-SCNC: 109 MMOL/L (ref 98–107)
CO2 SERPL-SCNC: 26.9 MMOL/L (ref 21–32)
CO2 SERPL-SCNC: 27 MMOL/L (ref 21–32)
CREAT SERPL-MCNC: 0.5 MG/DL (ref 0.6–1.3)
CREAT SERPL-MCNC: 0.6 MG/DL (ref 0.6–1.3)
EOSINOPHIL # BLD AUTO: 0 K/UL (ref 0–0.4)
EOSINOPHIL NFR BLD AUTO: 0.2 % (ref 0–4)
ERYTHROCYTE [DISTWIDTH] IN BLOOD BY AUTOMATED COUNT: 14.4 % (ref 11.6–13.7)
GFR SERPL CREATININE-BSD FRML MDRD: (no result) ML/MIN (ref 90–?)
GFR SERPL CREATININE-BSD FRML MDRD: (no result) ML/MIN (ref 90–?)
GLUCOSE SERPL-MCNC: 108 MG/DL (ref 74–106)
GLUCOSE SERPL-MCNC: 140 MG/DL (ref 74–106)
HCT VFR BLD AUTO: 32.5 % (ref 36–52)
HGB BLD-MCNC: 11 G/DL (ref 12–18)
LDH SERPL-CCNC: 277 U/L (ref 85–227)
LYMPHOCYTES # BLD AUTO: 0.9 K/UL (ref 2–11.5)
LYMPHOCYTES NFR BLD AUTO: 27.3 % (ref 20.5–51.1)
MAGNESIUM SERPL-MCNC: 1.7 MG/DL (ref 1.8–2.4)
MAGNESIUM SERPL-MCNC: 2.1 MG/DL (ref 1.8–2.4)
MCH RBC QN AUTO: 30 PG (ref 27–31)
MCHC RBC AUTO-ENTMCNC: 34 G/DL (ref 33–37)
MCV RBC AUTO: 87.5 FL (ref 80–94)
MONOCYTES # BLD AUTO: 0.3 K/UL (ref 0.8–1)
MONOCYTES NFR BLD AUTO: 8.7 % (ref 1.7–9.3)
NEUTROPHILS # BLD AUTO: 2 K/UL (ref 1.8–7.7)
NEUTROPHILS NFR BLD AUTO: 63.7 % (ref 42.2–75.2)
PHOSPHATE SERPL-MCNC: 2.9 MG/DL (ref 2.5–4.9)
PHOSPHATE SERPL-MCNC: 3 MG/DL (ref 2.5–4.9)
PLATELET # BLD AUTO: 130 K/UL (ref 140–450)
POTASSIUM SERPL-SCNC: 3.1 MMOL/L (ref 3.5–5.1)
POTASSIUM SERPL-SCNC: 3.2 MMOL/L (ref 3.5–5.1)
RBC # BLD AUTO: 3.71 MIL/UL (ref 4.2–6.1)
SODIUM SERPL-SCNC: 143 MMOL/L (ref 136–145)
SODIUM SERPL-SCNC: 146 MMOL/L (ref 136–145)
WBC # BLD AUTO: 3.2 K/UL (ref 4.8–10.8)

## 2021-01-09 RX ADMIN — CALCIUM SCH MG: 500 TABLET ORAL at 08:58

## 2021-01-09 RX ADMIN — DIVALPROEX SODIUM SCH MG: 125 CAPSULE ORAL at 09:00

## 2021-01-09 RX ADMIN — DIVALPROEX SODIUM SCH MG: 125 CAPSULE ORAL at 21:00

## 2021-01-09 RX ADMIN — OXYCODONE HYDROCHLORIDE AND ACETAMINOPHEN SCH MG: 500 TABLET ORAL at 08:59

## 2021-01-09 RX ADMIN — FAMOTIDINE SCH MG: 20 TABLET ORAL at 21:00

## 2021-01-09 RX ADMIN — MAGNESIUM SULFATE IN WATER PRN MLS/HR: 40 INJECTION, SOLUTION INTRAVENOUS at 15:11

## 2021-01-09 RX ADMIN — Medication SCH DEV: at 07:00

## 2021-01-09 RX ADMIN — Medication SCH DEV: at 11:30

## 2021-01-09 RX ADMIN — SERTRALINE HYDROCHLORIDE SCH MG: 50 TABLET ORAL at 21:00

## 2021-01-09 RX ADMIN — INSULIN LISPRO PRN UNITS: 100 INJECTION, SOLUTION INTRAVENOUS; SUBCUTANEOUS at 14:04

## 2021-01-09 RX ADMIN — Medication SCH MG: at 08:59

## 2021-01-09 RX ADMIN — Medication SCH IU: at 08:58

## 2021-01-09 RX ADMIN — ATORVASTATIN CALCIUM SCH MG: 20 TABLET, FILM COATED ORAL at 21:00

## 2021-01-09 RX ADMIN — FAMOTIDINE SCH MG: 20 TABLET ORAL at 08:59

## 2021-01-09 RX ADMIN — DEXTROSE AND SODIUM CHLORIDE SCH MLS/HR: 5; .45 INJECTION, SOLUTION INTRAVENOUS at 04:05

## 2021-01-09 RX ADMIN — MEGESTROL ACETATE SCH MG: 40 TABLET ORAL at 09:00

## 2021-01-09 RX ADMIN — DEXTROSE AND SODIUM CHLORIDE SCH MLS/HR: 5; .45 INJECTION, SOLUTION INTRAVENOUS at 11:35

## 2021-01-09 RX ADMIN — DOCUSATE SODIUM SCH MG: 100 CAPSULE, LIQUID FILLED ORAL at 08:57

## 2021-01-09 RX ADMIN — AZITHROMYCIN DIHYDRATE SCH MG: 250 TABLET, FILM COATED ORAL at 08:59

## 2021-01-09 RX ADMIN — ENOXAPARIN SODIUM SCH MG: 40 INJECTION SUBCUTANEOUS at 09:24

## 2021-01-09 RX ADMIN — CLOPIDOGREL BISULFATE SCH MG: 75 TABLET, FILM COATED ORAL at 08:57

## 2021-01-09 RX ADMIN — Medication SCH DEV: at 15:30

## 2021-01-09 RX ADMIN — Medication SCH DEV: at 21:00

## 2021-01-09 RX ADMIN — Medication SCH TAB: at 08:57

## 2021-01-09 NOTE — NUR
WIFE CALLED AND RECEIVED UPDATE ON PT STATUS. WIFE WANTS TO BE INFORMED OF WHEN PT IS D/C'D. 
WILL ENDORSE TO AM SHIFT.

## 2021-01-09 NOTE — NUR
RECEIVED PATIENT FROM NIGHT SHIFT NURSE. A RESPIRATIONS EVEN, UNLABORED. ON ROOM AIR.  NO 
C/O PAIN. NO S/S ACUTE DISTRESS. SKIN WARM, DRY. IV SITE TO LEFT WRIST 22G, INFUSING FLUIDS 
WELL. PLAN OF CARE DISCUSSED. ISOLATION PRECAUTIONS OBSERVED. BED IN LOW POSITION AND CALL 
LIGHT WITHIN REACH. WILL CONTINUE TO MONITOR.

## 2021-01-09 NOTE — NUR
PT SITTING UP IN BED HE RECEIVED ALL ORDERED MEDICATION. PT UNABLE TO VERBALIZE 
UNDERSTANDING OF MEDICATIONS AND ITS PURPOSES. PT WAS TURNED AND REPOSITIONED IN BED. ALL 
ORDERED PRECAUTIONS IN PLACE.

## 2021-01-09 NOTE — NUR
PATIENT ACCIDENTLY PULLED OUT IV. CANNULA INTACT. NEW IV STARTED TO LEFT WRIST 22G USING 
ASEPTIC TECHNIQUE. FLUIDS RESTARTED. PATIENT TOLERATED IV INSERTION WELL.

## 2021-01-09 NOTE — NUR
MADE ROUNDS. PATIENT IS ASLEEP. NO S/S ACUTE DISTRESS. ISOLATION PRECAUTIONS OBSERVED. 
SAFETY PRECAUTIONS IN PLACE. FREQUENT ROUNDS BY ALL STAFF.

## 2021-01-09 NOTE — NUR
MAGNESIUM WAS AT 1.7. ADMINISTERED MAGNESIUM SULFATE IVPB AS PER MD ORDERED. WILL CONTINUE 
TO MONITOR.

## 2021-01-09 NOTE — NUR
PT IN BED RESTING ON ROOM AIR, NO S/S OF PAIN OR DISTRESS NOTED. PT HAS L WRIST 22 GUAGE 
RUNNING D51/2 N/SAT 60 MLS/HR. V/S AS FOLLOWS: T 98.5 P 77 R 20 B/P 110/83 02 99% ON ROOM 
AIR. FINGERSTICK , NO HUMALOG COVERAGE NEEDED.

## 2021-01-09 NOTE — NUR
RECEIVED ENDORSEMENT FOR RN DAYSHIFT NURSE AT BEDSIDE FOR CONTINUITY OF CARE, PT IN STABLE 
CONDITION.

## 2021-01-09 NOTE — NUR
ALL SCHEDULED MEDS GIVEN. PT IS STABLE AND NO RESPIRATORY DISTRESS IS NOTED. WILL CONTINUE 
TO MONITOR.

## 2021-01-09 NOTE — NUR
TURNED AND REPOSITIONED FOR COMFORT. PATIENT RESTING COMFORTABLY IN BED. ISOLATION 
PRECAUTIONS OBSERVED. SAFETY PRECAUTIONS IN PLACE. FREQUENT ROUNDS BY ALL STAFF.

## 2021-01-10 VITALS — SYSTOLIC BLOOD PRESSURE: 107 MMHG | DIASTOLIC BLOOD PRESSURE: 59 MMHG

## 2021-01-10 VITALS — SYSTOLIC BLOOD PRESSURE: 140 MMHG | DIASTOLIC BLOOD PRESSURE: 68 MMHG

## 2021-01-10 VITALS — DIASTOLIC BLOOD PRESSURE: 71 MMHG | SYSTOLIC BLOOD PRESSURE: 122 MMHG

## 2021-01-10 VITALS — SYSTOLIC BLOOD PRESSURE: 107 MMHG | DIASTOLIC BLOOD PRESSURE: 52 MMHG

## 2021-01-10 LAB
ANION GAP SERPL CALCULATED.3IONS-SCNC: 13.9 MMOL/L (ref 8–16)
BASOPHILS # BLD AUTO: 0 K/UL (ref 0–0.22)
BASOPHILS NFR BLD AUTO: 0.2 % (ref 0–2)
BUN SERPL-MCNC: 18 MG/DL (ref 7–18)
CHLORIDE SERPL-SCNC: 108 MMOL/L (ref 98–107)
CO2 SERPL-SCNC: 24.2 MMOL/L (ref 21–32)
CREAT SERPL-MCNC: 0.5 MG/DL (ref 0.6–1.3)
EOSINOPHIL # BLD AUTO: 0 K/UL (ref 0–0.4)
EOSINOPHIL NFR BLD AUTO: 0.2 % (ref 0–4)
ERYTHROCYTE [DISTWIDTH] IN BLOOD BY AUTOMATED COUNT: 14.5 % (ref 11.6–13.7)
GFR SERPL CREATININE-BSD FRML MDRD: (no result) ML/MIN (ref 90–?)
GLUCOSE SERPL-MCNC: 99 MG/DL (ref 74–106)
HCT VFR BLD AUTO: 29.3 % (ref 36–52)
HGB BLD-MCNC: 10.1 G/DL (ref 12–18)
LYMPHOCYTES # BLD AUTO: 1.1 K/UL (ref 2–11.5)
LYMPHOCYTES NFR BLD AUTO: 40.5 % (ref 20.5–51.1)
MAGNESIUM SERPL-MCNC: 1.8 MG/DL (ref 1.8–2.4)
MCH RBC QN AUTO: 30 PG (ref 27–31)
MCHC RBC AUTO-ENTMCNC: 34 G/DL (ref 33–37)
MCV RBC AUTO: 86.1 FL (ref 80–94)
MONOCYTES # BLD AUTO: 0.3 K/UL (ref 0.8–1)
MONOCYTES NFR BLD AUTO: 11 % (ref 1.7–9.3)
NEUTROPHILS # BLD AUTO: 1.3 K/UL (ref 1.8–7.7)
NEUTROPHILS NFR BLD AUTO: 48.1 % (ref 42.2–75.2)
PHOSPHATE SERPL-MCNC: 2.3 MG/DL (ref 2.5–4.9)
PLATELET # BLD AUTO: 150 K/UL (ref 140–450)
POTASSIUM SERPL-SCNC: 3.1 MMOL/L (ref 3.5–5.1)
RBC # BLD AUTO: 3.4 MIL/UL (ref 4.2–6.1)
SODIUM SERPL-SCNC: 143 MMOL/L (ref 136–145)
WBC # BLD AUTO: 2.6 K/UL (ref 4.8–10.8)

## 2021-01-10 RX ADMIN — Medication SCH DEV: at 16:30

## 2021-01-10 RX ADMIN — Medication SCH TAB: at 08:23

## 2021-01-10 RX ADMIN — Medication SCH MG: at 08:25

## 2021-01-10 RX ADMIN — DEXTROSE AND SODIUM CHLORIDE SCH MLS/HR: 5; .45 INJECTION, SOLUTION INTRAVENOUS at 20:55

## 2021-01-10 RX ADMIN — DOCUSATE SODIUM SCH MG: 100 CAPSULE, LIQUID FILLED ORAL at 08:24

## 2021-01-10 RX ADMIN — Medication SCH DEV: at 11:30

## 2021-01-10 RX ADMIN — SERTRALINE HYDROCHLORIDE SCH MG: 50 TABLET ORAL at 19:36

## 2021-01-10 RX ADMIN — ENOXAPARIN SODIUM SCH MG: 40 INJECTION SUBCUTANEOUS at 08:28

## 2021-01-10 RX ADMIN — AZITHROMYCIN DIHYDRATE SCH MG: 250 TABLET, FILM COATED ORAL at 08:22

## 2021-01-10 RX ADMIN — CALCIUM SCH MG: 500 TABLET ORAL at 08:24

## 2021-01-10 RX ADMIN — DIVALPROEX SODIUM SCH MG: 125 CAPSULE ORAL at 08:31

## 2021-01-10 RX ADMIN — FAMOTIDINE SCH MG: 20 TABLET ORAL at 19:35

## 2021-01-10 RX ADMIN — INSULIN LISPRO PRN UNITS: 100 INJECTION, SOLUTION INTRAVENOUS; SUBCUTANEOUS at 17:33

## 2021-01-10 RX ADMIN — DIVALPROEX SODIUM SCH MG: 125 CAPSULE ORAL at 19:33

## 2021-01-10 RX ADMIN — Medication SCH IU: at 08:23

## 2021-01-10 RX ADMIN — OXYCODONE HYDROCHLORIDE AND ACETAMINOPHEN SCH MG: 500 TABLET ORAL at 08:24

## 2021-01-10 RX ADMIN — MEGESTROL ACETATE SCH MG: 40 TABLET ORAL at 08:23

## 2021-01-10 RX ADMIN — CLOPIDOGREL BISULFATE SCH MG: 75 TABLET, FILM COATED ORAL at 08:25

## 2021-01-10 RX ADMIN — ATORVASTATIN CALCIUM SCH MG: 20 TABLET, FILM COATED ORAL at 19:33

## 2021-01-10 RX ADMIN — INSULIN LISPRO PRN UNITS: 100 INJECTION, SOLUTION INTRAVENOUS; SUBCUTANEOUS at 14:45

## 2021-01-10 RX ADMIN — FAMOTIDINE SCH MG: 20 TABLET ORAL at 08:22

## 2021-01-10 RX ADMIN — Medication SCH DEV: at 07:03

## 2021-01-10 RX ADMIN — DEXTROSE AND SODIUM CHLORIDE SCH MLS/HR: 5; .45 INJECTION, SOLUTION INTRAVENOUS at 04:15

## 2021-01-10 RX ADMIN — Medication SCH DEV: at 21:00

## 2021-01-10 NOTE — NUR
01/10/21 RD FOLLOW UP COMPLETED

PLEASE REFER TO NUTRITION PROGRESS NOTES UNDER CARE ACTIVITY FOR ESTIMATED NUTRITIONAL 
NEEDS. 



RD RECOMMENDATIONS:

1. RECOMMEND PUREE DIET

2. RECOMMEND CONTINUE ADD DIET HEALTHSHAKES TID WITH MEALS TO HELP MEET GOALS/INCREASE 
ENERGY & PROTEIN INTAKE

3. CONTINUE ENCOURAGE INCREASED PO INTAKE, ASSIST WITH DIET AS NEEDED

4. F/U 2-3 DAYS; HIGH RISK

MARIANELA KERR MBA, RD

## 2021-01-10 NOTE — NUR
SPOKE WITH STAFF AT [facility], UPDATED STAFF 
REGARDING PT ARRIVAL TOMORROW. ALSO UPDATED WIFE REGARDING PT TRANSFER. NEW IV SITE 24G ON 
RIGHT HAND.

## 2021-01-10 NOTE — NUR
KAELA CALLED AND SHE SAID THEY WILL ACCEPT PATIENT AND WILL GO TO ROOM 54-A [facility]ES[facility] 
NOTIFIED AND MADE AWARE. ENDORSE TO THE NEXT SHIFT , WAITING FOR TRANSPORT. IN STABLE 
CONDITION.

## 2021-01-10 NOTE — NUR
RECEIVED REPORT FROM NIGHT SHIFT FOR CONTINUITY OF CARE. PATIENT AWAKE ALERT X1 NOT IN 
DISTRESS NOTED. WITH IVF ON GOING AND INFUSING WELL. INITIAL ASSESSMENT INITIATED.  DROPLET 
PRECAUTION OBSERVED. NEEDS ATTENDED, WILL CONTINUE TO MONITOR.

## 2021-01-10 NOTE — NUR
PT WAS FED, PM MEDS DUE WERE GIVEN AS WELL. PT WAS TURNED, CHANGED AND REPOSITIONED IN BED. 
V/S AS FOLLOWS: T 97 P 98 R 20 B/P 122/71 02 99% ON ROOM AIR. FINGERSTICK IS 83, NO HUMALOG 
COVERAGE NEEDED. ALL ORDERED PRECAUTIONS IN PLACE.

## 2021-01-10 NOTE — NUR
UPDATED WIFE FOR PLAN OF CARE. CALLED [facility] FOR TRANSPORT SINCE TRANSPORTATION SET UP FOR 
WILL CALL DOESN'T HAVE ON THE WEEKEND. INFORMED [facility] AND WAITING FOR THIER CALL.

## 2021-01-10 NOTE — NUR
PT IN BED NO S/S OF PAIN OR DISTRESS NOTED, V/S AS FOLLOWS: T 97.2 P 67 R 20 B/P 107/59 02 
97% ON ROOM AIR.

## 2021-01-10 NOTE — NUR
CALLED [facility] AND TALKED TO HUGH FROM TRANSPORT DEPT, PER [facility] TRANSPORT WAS HERE AT 1940 
AND IT SAID THAT PT IS A NO SHOW, TOLD HER THAT THIS PT IS BEDBOUND AND ARRANGED AS A GURNEY 
TRANSPORT FOR POSITIVE COVID PT, SHE STATED SHE DOESN'T KNOW HOW THAT HAPPENED AND THAT THE 
PREVIOUS TRANSPORT WAS NOT ARRANGED FOR COVID PTS, ASK IF SHE CAN DISPATCH A TRANSPORT 
TONIGHT BUT SHE SAID THAT FOR A COVID TRANSPORT IT WILL BE A SLIM CHANGE THAT THERE WILL BE 
ANY AVAILABLE TONIGHT, ASK IF I CAN SET UP A TRANSPORT FOR TOMORROW AND SHE SAID TO CALL 
TOMORROW DURING OFFICE HOURS TO ARRANGE FOR TRANSPORT, PT'S ABHILASH HANDY MADE AWARE.

## 2021-01-10 NOTE — NUR
[facility] TRANSPORT AND SAT UP FOR 1930  [facility]IA [facility]. WILL INFORM WIFE AND WILL 
GIVE REPORT.

## 2021-01-10 NOTE — NUR
CALLING REPORT TO [facility] AND SPOKE TO KAELA COLIN, SHE SAID THAT THEIR NOT AWARE OF 
PATIENT BEING COVID POSITIVE. SHE SAID SHE WILL CALL BACK AND SHE WILL TALK TO THEIR 
SUPERVISOR.

## 2021-01-11 VITALS — SYSTOLIC BLOOD PRESSURE: 136 MMHG | DIASTOLIC BLOOD PRESSURE: 83 MMHG

## 2021-01-11 VITALS — DIASTOLIC BLOOD PRESSURE: 55 MMHG | SYSTOLIC BLOOD PRESSURE: 124 MMHG

## 2021-01-11 RX ADMIN — Medication SCH DEV: at 06:42

## 2021-01-11 RX ADMIN — CLOPIDOGREL BISULFATE SCH MG: 75 TABLET, FILM COATED ORAL at 08:50

## 2021-01-11 RX ADMIN — OXYCODONE HYDROCHLORIDE AND ACETAMINOPHEN SCH MG: 500 TABLET ORAL at 08:50

## 2021-01-11 RX ADMIN — ENOXAPARIN SODIUM SCH MG: 40 INJECTION SUBCUTANEOUS at 08:54

## 2021-01-11 RX ADMIN — FAMOTIDINE SCH MG: 20 TABLET ORAL at 08:51

## 2021-01-11 RX ADMIN — DIVALPROEX SODIUM SCH MG: 125 CAPSULE ORAL at 08:51

## 2021-01-11 RX ADMIN — Medication SCH TAB: at 08:51

## 2021-01-11 RX ADMIN — CALCIUM SCH MG: 500 TABLET ORAL at 08:48

## 2021-01-11 RX ADMIN — Medication SCH IU: at 08:53

## 2021-01-11 RX ADMIN — Medication SCH MG: at 08:49

## 2021-01-11 RX ADMIN — MEGESTROL ACETATE SCH MG: 40 TABLET ORAL at 08:52

## 2021-01-11 RX ADMIN — DOCUSATE SODIUM SCH MG: 100 CAPSULE, LIQUID FILLED ORAL at 08:50

## 2021-01-11 NOTE — NUR
PT TURNED, CHANGED AND REPOSITIONED IN BED. FLUIDS RUNNING AS ORDERED. PT RESPIRATIONS EVEN 
AND UNLABORED ON ROOM AIR. ALL ORDERED PRECAUTIONS IN PLACE.

## 2021-01-11 NOTE — NUR
PATIENT PICKED UP FROM [facility]. PATIENT IS CLEAN, BELONGINGS PACKED, D/C PAPERWORK IN 
PATIENT BELONGINGS BAG. REMOVED IV LINE AND ID BAND. PATIENT IS STABLE.

## 2021-01-11 NOTE — NUR
ADMINISTERED PRESCRIBED MEDS PER MD ORDER. PATIENT TOLERATED WELL. MEDICATION EDUCATION 
REINFORCEMENT NEEDED, PATIENT IS CONFUSED. SAFETY MEASURES IN PLACE. WILL CONT TO MONITOR.

## 2021-01-11 NOTE — NUR
REC'D CALL FROM DINORA AT D/C PLANNING. PATIENT IS SCHED TO BE PICKED UP AT 1045 BY [facility] 
[facility]. PATIENT IS GOING TO [facility] ROOM 3B.

## 2021-01-11 NOTE — NUR
PT RESTING IN BED, FLUIDS RUNNING AS ORDERED. PT HAS NO S/S OF PAIN OR DISTRESS NOTED. PT 
REPOSITIONED IN BED AND ALL ORDERED PRECAUTIONS IN PLACE.

## 2024-06-25 NOTE — NUR
PT AWAKEB RESTING IN BED. NO S/S ACUTE RESPIRATORY DISTRESS. WILL ENDORSE TO DAY RN FOR 
CONTINUITY OF CARE. PT IS IN STABLE CONDITION Billing Type: Third-Party Bill Bill For Surgical Tray: no Lab Facility: 0 Performing Laboratory: [id number] Expected Date Of Service: 06/25/2024